# Patient Record
Sex: MALE | Race: ASIAN | NOT HISPANIC OR LATINO | URBAN - METROPOLITAN AREA
[De-identification: names, ages, dates, MRNs, and addresses within clinical notes are randomized per-mention and may not be internally consistent; named-entity substitution may affect disease eponyms.]

---

## 2019-08-18 ENCOUNTER — EMERGENCY (EMERGENCY)
Facility: HOSPITAL | Age: 54
LOS: 1 days | Discharge: HOME | End: 2019-08-18
Attending: EMERGENCY MEDICINE | Admitting: EMERGENCY MEDICINE
Payer: SELF-PAY

## 2019-08-18 VITALS
SYSTOLIC BLOOD PRESSURE: 121 MMHG | OXYGEN SATURATION: 97 % | RESPIRATION RATE: 22 BRPM | HEART RATE: 92 BPM | TEMPERATURE: 99 F | DIASTOLIC BLOOD PRESSURE: 77 MMHG

## 2019-08-18 VITALS
SYSTOLIC BLOOD PRESSURE: 105 MMHG | OXYGEN SATURATION: 100 % | HEART RATE: 80 BPM | RESPIRATION RATE: 17 BRPM | DIASTOLIC BLOOD PRESSURE: 75 MMHG

## 2019-08-18 PROCEDURE — 99282 EMERGENCY DEPT VISIT SF MDM: CPT

## 2019-08-18 PROCEDURE — 99053 MED SERV 10PM-8AM 24 HR FAC: CPT

## 2019-08-18 RX ORDER — TETANUS TOXOID, REDUCED DIPHTHERIA TOXOID AND ACELLULAR PERTUSSIS VACCINE, ADSORBED 5; 2.5; 8; 8; 2.5 [IU]/.5ML; [IU]/.5ML; UG/.5ML; UG/.5ML; UG/.5ML
0.5 SUSPENSION INTRAMUSCULAR ONCE
Refills: 0 | Status: COMPLETED | OUTPATIENT
Start: 2019-08-18 | End: 2019-08-18

## 2019-08-18 NOTE — ED PROVIDER NOTE - PROGRESS NOTE DETAILS
Zvi Dubin, MD note: Pt reassessed - no complaints.  Vital signs normal.  Resting comfortably.  A&Ox3.  Speech clear. Unassisted gait steady & normal.  Pt is not tremulous.  Clinically sober, communicating appropriately, calm & interactive.  Refusing tetanus shot.  No clinical evidence of withdrawal.  Pt advised to avoid alcohol in excess & to seek help for unhealthy alcohol use.  Will look to d/c pt now.
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
## 2019-08-18 NOTE — ED PROVIDER NOTE - NSFOLLOWUPINSTRUCTIONS_ED_ALL_ED_FT
-- Please do not drink alcohol in excess.  Please seek help for your problematic and/or frequent alcohol use and never drive, make important decisions or use machinery after consuming any amount of alcohol.  Please also do not take tylenol (also called acetaminophen) with any amount of alcohol as this combination can cause permanent liver damage.  -- Please follow up with your doctor(s) within the next 3 days, but seek medical care sooner if your symptoms worsen. Call for an appointment as soon as possible.  -- If you have worsening or concerning symptoms, see your doctor right away or return to the Emergency Department immediately.
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
## 2019-08-18 NOTE — ED ADULT NURSE NOTE - NSIMPLEMENTINTERV_GEN_ALL_ED
Implemented All Fall Risk Interventions:  [place] to call system. Call bell, personal items and telephone within reach. Instruct patient to call for assistance. Room bathroom lighting operational. Non-slip footwear when patient is off stretcher. Physically safe environment: no spills, clutter or unnecessary equipment. Stretcher in lowest position, wheels locked, appropriate side rails in place. Provide visual cue, wrist band, yellow gown, etc. Monitor gait and stability. Monitor for mental status changes and reorient to person, place, and time. Review medications for side effects contributing to fall risk. Reinforce activity limits and safety measures with patient and family.

## 2019-08-18 NOTE — ED PROVIDER NOTE - PHYSICAL EXAMINATION
VS: afebrile, others   Gen: Well appearing in NAD  Head: NC/AT  HEENT: moist mucous membranes  eyes: R eye without a pupil, opacified.  L pupil RRLA, EOMI.  conjunctiva injected b/l  Neck: trachea midline  Resp:  No distress  Ext: L lower extremity exam: There is no gross visible bony deformity. No significant or asymmetric soft tissue swelling.  Full active & passive ROM.  5/5 strength.  5/5 sensation. 2+ distal pulses.  Brisk cap refill.  ~1cm circular superficial abrasion distal third of ant shin. Lower ext extensor mechanism intact.  Neuro:  A&Ox2-3  Skin:  Warm and dry as visualized  Psych:  blunted affect, tangential but redirects DUBIN:   VS: afebrile, others   Gen: Well appearing in NAD  Head: NC/AT  HEENT: moist mucous membranes  eyes: R eye without a pupil, opacified.  L pupil RRLA, EOMI.  conjunctiva injected b/l  Neck: trachea midline  Resp:  No distress  Ext: L lower extremity exam: There is no gross visible bony deformity. No significant or asymmetric soft tissue swelling.  Full active & passive ROM.  5/5 strength.  5/5 sensation. 2+ distal pulses.  Brisk cap refill.  ~1cm circular superficial abrasion distal third of ant shin. Lower ext extensor mechanism intact.  Neuro:  A&Ox2-3  Skin:  Warm and dry as visualized  Psych:  blunted affect, tangential but redirects

## 2019-08-18 NOTE — ED PROVIDER NOTE - CLINICAL SUMMARY MEDICAL DECISION MAKING FREE TEXT BOX
pt clinically intoxicated, no evidence of major trauma or other pathology.  will allow to MTF  will give adacel for abrasion on LLE. pt clinically intoxicated, no evidence of major trauma or other pathology.  will allow to MTF  will offer adacel for abrasion on LLE. DUBIN: pt clinically intoxicated, no evidence of major trauma or other pathology.  will allow to MTF  will offer adacel for abrasion on LLE.
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
## 2020-08-07 NOTE — ED ADULT NURSE NOTE - NS ED NURSE LEVEL OF CONSCIOUSNESS AFFECT

## 2022-07-27 NOTE — ED ADULT TRIAGE NOTE - ARRIVAL INFO ADDITIONAL COMMENTS
pt arrives in shorts, martha shirt, sneakers. No cell phone ( wearing yellow metal bracelet), No wallet no ID. (4) no limitation